# Patient Record
Sex: FEMALE | Race: WHITE
[De-identification: names, ages, dates, MRNs, and addresses within clinical notes are randomized per-mention and may not be internally consistent; named-entity substitution may affect disease eponyms.]

---

## 2018-09-26 ENCOUNTER — HOSPITAL ENCOUNTER (OUTPATIENT)
Dept: HOSPITAL 9 - MADRAD | Age: 63
Discharge: HOME | End: 2018-09-26
Attending: PHYSICIAN ASSISTANT
Payer: MEDICARE

## 2018-09-26 DIAGNOSIS — R09.89: Primary | ICD-10-CM

## 2018-09-26 PROCEDURE — 71046 X-RAY EXAM CHEST 2 VIEWS: CPT

## 2018-09-26 NOTE — RAD
2 VIEWS CHEST:

 

Date:  09/26/18 

 

COMPARISON:  

None. 

 

HISTORY:  

Chest congestion for 6 days. 

 

FINDINGS:

Two views of the chest show normal sized cardiomediastinal silhouette. There is no evidence of consol
idation, mass, or pleural effusion. The bones are unremarkable.  

 

IMPRESSION: 

No evidence of acute cardiopulmonary disease.   

 

 

 

POS: [facility]

## 2018-09-27 ENCOUNTER — HOSPITAL ENCOUNTER (EMERGENCY)
Dept: HOSPITAL 9 - MADERS | Age: 63
Discharge: HOME | End: 2018-09-27
Payer: MEDICARE

## 2018-09-27 DIAGNOSIS — J44.1: Primary | ICD-10-CM

## 2018-09-27 DIAGNOSIS — I50.9: ICD-10-CM

## 2018-09-27 DIAGNOSIS — I11.0: ICD-10-CM

## 2018-09-27 PROCEDURE — 93005 ELECTROCARDIOGRAM TRACING: CPT

## 2018-09-27 PROCEDURE — 71046 X-RAY EXAM CHEST 2 VIEWS: CPT

## 2018-09-27 NOTE — RAD
TWO VIEWS CHEST:

 

Date: 9-27-18 

 

Provided Clinical History: 

Shortness of breath. 

 

FINDINGS: 

Comparison 9-26-18. 

 

Cardiac and mediastinal silhouette is unchanged in appearance. Vascular calcification involves the ao
rtic arch. No focal consolidation, pleural fluid, or pneumothorax apparent. 

 

IMPRESSION: 

No evidence for an acute cardiopulmonary process. 

 

POS: [facility]